# Patient Record
Sex: FEMALE | Race: WHITE | NOT HISPANIC OR LATINO | Employment: OTHER | URBAN - NONMETROPOLITAN AREA
[De-identification: names, ages, dates, MRNs, and addresses within clinical notes are randomized per-mention and may not be internally consistent; named-entity substitution may affect disease eponyms.]

---

## 2017-07-27 ENCOUNTER — TELEPHONE (OUTPATIENT)
Dept: OBSTETRICS AND GYNECOLOGY | Facility: CLINIC | Age: 77
End: 2017-07-27

## 2017-07-27 NOTE — TELEPHONE ENCOUNTER
----- Message from Tonia Polanco sent at 7/27/2017  2:38 PM CDT -----  Regarding: REFILL/HORMONE  Contact: [phone]  ESTRADOL..0.5/HAS APPT 0918./W [facility].. INTO ..[facility] [place]

## 2017-09-20 ENCOUNTER — OFFICE VISIT (OUTPATIENT)
Dept: OBSTETRICS AND GYNECOLOGY | Facility: CLINIC | Age: 77
End: 2017-09-20

## 2017-09-20 VITALS
SYSTOLIC BLOOD PRESSURE: 131 MMHG | BODY MASS INDEX: 26.2 KG/M2 | WEIGHT: 163 LBS | DIASTOLIC BLOOD PRESSURE: 63 MMHG | HEIGHT: 66 IN

## 2017-09-20 DIAGNOSIS — Z12.31 ENCOUNTER FOR SCREENING MAMMOGRAM FOR BREAST CANCER: ICD-10-CM

## 2017-09-20 DIAGNOSIS — B37.2 CANDIDAL SKIN INFECTION: Chronic | ICD-10-CM

## 2017-09-20 DIAGNOSIS — N32.81 OVERACTIVE BLADDER: Chronic | ICD-10-CM

## 2017-09-20 DIAGNOSIS — N95.1 MENOPAUSAL AND FEMALE CLIMACTERIC STATES: Primary | Chronic | ICD-10-CM

## 2017-09-20 PROCEDURE — 99204 OFFICE O/P NEW MOD 45 MIN: CPT | Performed by: OBSTETRICS & GYNECOLOGY

## 2017-09-20 RX ORDER — CLOTRIMAZOLE AND BETAMETHASONE DIPROPIONATE 10; .64 MG/G; MG/G
CREAM TOPICAL 2 TIMES DAILY
Qty: 1 EACH | Refills: 12 | Status: SHIPPED | OUTPATIENT
Start: 2017-09-20

## 2017-09-20 RX ORDER — PIOGLITAZONEHYDROCHLORIDE 15 MG/1
15 TABLET ORAL EVERY MORNING
COMMUNITY
End: 2020-03-17 | Stop reason: ALTCHOICE

## 2017-09-20 RX ORDER — GABAPENTIN 300 MG/1
300 CAPSULE ORAL 3 TIMES DAILY
COMMUNITY

## 2017-09-20 RX ORDER — SIMVASTATIN 40 MG
40 TABLET ORAL NIGHTLY
COMMUNITY

## 2017-09-20 RX ORDER — MELOXICAM 15 MG/1
15 TABLET ORAL DAILY
COMMUNITY
End: 2020-03-17 | Stop reason: ALTCHOICE

## 2017-09-20 RX ORDER — FLUTICASONE PROPIONATE 50 MCG
2 SPRAY, SUSPENSION (ML) NASAL DAILY
COMMUNITY

## 2017-09-20 RX ORDER — HYDROCORTISONE ACETATE 0.5 %
1 CREAM (GRAM) TOPICAL DAILY
COMMUNITY
End: 2020-03-17 | Stop reason: ALTCHOICE

## 2017-09-20 RX ORDER — FOLIC ACID 1 MG/1
1 TABLET ORAL DAILY
COMMUNITY

## 2017-09-20 RX ORDER — RANITIDINE 150 MG/1
150 TABLET ORAL 2 TIMES DAILY
COMMUNITY
End: 2020-03-17 | Stop reason: ALTCHOICE

## 2017-09-20 RX ORDER — ESTRADIOL 1 MG/1
1 TABLET ORAL DAILY
COMMUNITY
End: 2019-02-05 | Stop reason: SDUPTHER

## 2017-09-20 RX ORDER — ASPIRIN 81 MG/1
81 TABLET ORAL DAILY
COMMUNITY

## 2017-09-20 RX ORDER — BIOTIN 10 MG
1 TABLET ORAL DAILY
COMMUNITY

## 2017-09-20 RX ORDER — LOSARTAN POTASSIUM 50 MG/1
50 TABLET ORAL NIGHTLY
COMMUNITY
End: 2020-05-21 | Stop reason: ALTCHOICE

## 2017-09-20 RX ORDER — CETIRIZINE HYDROCHLORIDE 10 MG/1
10 TABLET ORAL DAILY
COMMUNITY

## 2017-09-20 RX ORDER — CARVEDILOL 6.25 MG/1
6.25 TABLET ORAL 2 TIMES DAILY WITH MEALS
COMMUNITY
End: 2020-03-17 | Stop reason: ALTCHOICE

## 2017-09-20 RX ORDER — FERROUS SULFATE 325(65) MG
325 TABLET ORAL
COMMUNITY

## 2017-09-20 NOTE — PROGRESS NOTES
Karen Steven is a 77 y.o. y/o female     Chief Complaint: Establish care, overactive bladder, yeast infection of the skin, menopausal    HPI: 77-year-old  with 4 prior vaginal deliveries.  She has had a hysterectomy with BSO.  She is here to establish care, treat yeast infection of the skin, and discuss options for treatment of overactive bladder.  She also needs her mammogram.    She initially try generic oxybutynin for the overactive bladder, and it helps some, but it dried her out so much that she quit taking it.  She saw Dr. Ngael in urology.  Dr. Franke initially placed her on Vesicare, but it cost her more than $300 a month, and she could not afford that.  She was then placed on the Oxytrol patch, and that is worked pretty well for her with much less side effects.    She needs her mammogram.  She gets Candida infections of the skin, particularly in the folds of her skin.  A topical antifungal with steroid usually helps with that.    I've ordered her mammogram.  We discussed the Oxytrol patch, and I recommended that she replace the patch every  and Thursday.  We discussed Kegel exercises.    Review of Systems   Constitutional: Positive for fatigue. Negative for activity change, appetite change, chills, diaphoresis, fever and unexpected weight change.   Gastrointestinal: Negative for abdominal pain, constipation, diarrhea and nausea.   Genitourinary: Negative for difficulty urinating, dyspareunia, dysuria, pelvic pain, urgency, vaginal bleeding, vaginal discharge and vaginal pain.   Neurological: Negative for headaches.   Psychiatric/Behavioral: Negative for dysphoric mood. The patient is not nervous/anxious.    All other systems reviewed and are negative.     Breast ROS: negative    The following portions of the patient's history were reviewed and updated as appropriate: allergies, current medications, past family history, past medical history, past social history, past surgical history and  problem list.    Allergies   Allergen Reactions   • Altace [Ramipril] Swelling          Current Outpatient Prescriptions:   •  aspirin 81 MG EC tablet, Take 81 mg by mouth Daily., Disp: , Rfl:   •  Biotin (BIOTIN MAXIMUM STRENGTH) 10 MG tablet, Take 1 tablet by mouth Daily., Disp: , Rfl:   •  carvedilol (COREG) 6.25 MG tablet, Take 6.25 mg by mouth 2 (Two) Times a Day With Meals., Disp: , Rfl:   •  cetirizine (zyrTEC) 10 MG tablet, Take 10 mg by mouth Daily., Disp: , Rfl:   •  Cholecalciferol (VITAMIN D3) 5000 units tablet tablet, Take 5,000 Units by mouth Daily., Disp: , Rfl:   •  estradiol (ESTRACE) 1 MG tablet, Take 1 mg by mouth Daily., Disp: , Rfl:   •  ferrous sulfate 325 (65 FE) MG tablet, Take 325 mg by mouth Daily With Breakfast., Disp: , Rfl:   •  fluticasone (FLONASE) 50 MCG/ACT nasal spray, 2 sprays into each nostril Daily., Disp: , Rfl:   •  folic acid (FOLVITE) 1 MG tablet, Take 1 mg by mouth Daily., Disp: , Rfl:   •  gabapentin (NEURONTIN) 300 MG capsule, Take 300 mg by mouth 2 (Two) Times a Day., Disp: , Rfl:   •  Glucosamine-Chondroit-Vit C-Mn (GLUCOSAMINE CHONDR 1500 COMPLX) capsule, Take 1 tablet by mouth Daily., Disp: , Rfl:   •  losartan (COZAAR) 50 MG tablet, Take 50 mg by mouth Every Night., Disp: , Rfl:   •  meloxicam (MOBIC) 15 MG tablet, Take 15 mg by mouth Daily., Disp: , Rfl:   •  Multiple Vitamins-Minerals (CENTRUM ULTRA WOMENS PO), Take 1 tablet by mouth Daily., Disp: , Rfl:   •  nystatin-triamcinolone (MYCOLOG II) 911534-6.1 UNIT/GM-% cream, Apply 1 application topically 4 (Four) Times a Day., Disp: , Rfl:   •  [START ON 9/21/2017] oxybutynin (OXYTROL) 3.9 MG/24HR, Place 1 patch on the skin 2 (Two) Times a Week., Disp: 24 patch, Rfl: 4  •  pioglitazone (ACTOS) 15 MG tablet, Take 15 mg by mouth Every Morning., Disp: , Rfl:   •  raNITIdine (ZANTAC) 150 MG tablet, Take 150 mg by mouth 2 (Two) Times a Day., Disp: , Rfl:   •  sertraline (ZOLOFT) 50 MG tablet, Take 50 mg by mouth every night  at bedtime., Disp: , Rfl:   •  simvastatin (ZOCOR) 40 MG tablet, Take 40 mg by mouth Every Night., Disp: , Rfl:   •  clotrimazole-betamethasone (LOTRISONE) 1-0.05 % cream, Apply  topically 2 (Two) Times a Day. 45 g tube, Disp: 1 each, Rfl: 12     The patient has a family history of   History reviewed. No pertinent family history.     Past Medical History:   Diagnosis Date   • Candidal skin infection 9/20/2017   • Menopausal and female climacteric states 9/20/2017   • Overactive bladder 9/20/2017        OB History     No data available           Social History     Social History   • Marital status:      Spouse name: N/A   • Number of children: N/A   • Years of education: N/A     Occupational History   • Not on file.     Social History Main Topics   • Smoking status: Never Smoker   • Smokeless tobacco: Not on file   • Alcohol use Not on file      Comment: Occasionally   • Drug use: No   • Sexual activity: Yes     Birth control/ protection: None      Comment: Hysterectomy     Other Topics Concern   • Not on file     Social History Narrative   • No narrative on file        Past Surgical History:   Procedure Laterality Date   • BACK SURGERY  03/24/2009    L-2, L-3/2 Rods 4 Screws   • BACK SURGERY  11/17/2009    L-3, L-4, 2 Rods 6 Screws   • BACK SURGERY  04/07/2010    L-5, S-1 2 Rods 8 Screws   • BACK SURGERY  04/21/2011   • BACK SURGERY  06/26/2014   • BLADDER SURGERY  03/24/1997   • BREAST BIOPSY  1986   • FOOT SURGERY Left 2001    Bunion   • FOOT SURGERY Right 2004    Bunion   • FOOT SURGERY Right 2005    Removed 3 Metatarsus heads   • HIP SURGERY Left 08/27/2015    Torn Tendon   • HYSTERECTOMY      Partial in 1970   • HYSTERECTOMY      Complete   • KNEE SURGERY Right 06/26/2012   • KNEE SURGERY Left 08/21/2014    Replacement        Patient Active Problem List   Diagnosis   • Menopausal and female climacteric states   • Overactive bladder   • Candidal skin infection        Documented Vitals    09/20/17 1348  "  BP: 131/63   Weight: 163 lb (73.9 kg)   Height: 66\" (167.6 cm)   PainSc: 0-No pain       Physical Exam   Constitutional: She is oriented to person, place, and time. No distress.   Lightly overweight white female weighing 163 pounds with BMI 26.3.   HENT:   Head: Normocephalic and atraumatic.   Eyes: Conjunctivae and EOM are normal. Pupils are equal, round, and reactive to light.   Neck: Normal range of motion. Neck supple. No JVD present. No tracheal deviation present. No thyromegaly present.   Cardiovascular: Normal rate, regular rhythm, normal heart sounds and intact distal pulses.  Exam reveals no gallop and no friction rub.    No murmur heard.  Pulmonary/Chest: Effort normal and breath sounds normal. No stridor. No respiratory distress. She has no wheezes. She has no rales. She exhibits no tenderness.   Abdominal: Soft. Bowel sounds are normal. She exhibits no distension and no mass. There is no tenderness. There is no rebound and no guarding. No hernia.   Musculoskeletal: Normal range of motion. She exhibits no edema, tenderness or deformity.   Lymphadenopathy:     She has no cervical adenopathy.   Neurological: She is alert and oriented to person, place, and time. She has normal reflexes. She displays normal reflexes. No cranial nerve deficit. She exhibits normal muscle tone. Coordination normal.   Skin: Skin is warm and dry. No rash noted. She is not diaphoretic. No erythema. No pallor.   Psychiatric: She has a normal mood and affect. Her behavior is normal. Judgment and thought content normal.   Nursing note and vitals reviewed.       Assessment        Diagnosis Plan   1. Menopausal and female climacteric states     2. Overactive bladder     3. Candidal skin infection     4. Encounter for screening mammogram for breast cancer  Mammo Screening Digital Tomosynthesis Bilateral With CAD         Plan      1. Oxytrol patch replaced every Sunday and Thursday.  2. Samples of Vesicare and Myrbetriq.  3. Lotrisone " cream to affected area twice a day.  4. Schedule mammogram.  5. Encouraged in diet and exercise.  6. Handouts on depression, hot flashes, exercise, and vitamin use.   7. Follow-up in 1 year.  Follow-up sooner as needed.            This document has been electronically signed by Albaro Lowery MD on September 20, 2017 2:45 PM

## 2019-02-05 ENCOUNTER — TELEPHONE (OUTPATIENT)
Dept: OBSTETRICS AND GYNECOLOGY | Facility: CLINIC | Age: 79
End: 2019-02-05

## 2019-02-05 RX ORDER — ESTRADIOL 1 MG/1
1 TABLET ORAL DAILY
Qty: 30 TABLET | Refills: 2 | Status: SHIPPED | OUTPATIENT
Start: 2019-02-05 | End: 2020-03-17 | Stop reason: ALTCHOICE

## 2020-03-17 ENCOUNTER — OFFICE VISIT (OUTPATIENT)
Dept: CARDIAC SURGERY | Facility: CLINIC | Age: 80
End: 2020-03-17

## 2020-03-17 VITALS
DIASTOLIC BLOOD PRESSURE: 64 MMHG | HEIGHT: 66 IN | WEIGHT: 163 LBS | HEART RATE: 76 BPM | TEMPERATURE: 97.6 F | SYSTOLIC BLOOD PRESSURE: 120 MMHG | BODY MASS INDEX: 26.2 KG/M2

## 2020-03-17 DIAGNOSIS — R09.89 BRUIT OF RIGHT CAROTID ARTERY: ICD-10-CM

## 2020-03-17 DIAGNOSIS — I73.9 PVD (PERIPHERAL VASCULAR DISEASE) WITH CLAUDICATION (HCC): Primary | ICD-10-CM

## 2020-03-17 PROCEDURE — 99203 OFFICE O/P NEW LOW 30 MIN: CPT | Performed by: NURSE PRACTITIONER

## 2020-03-17 NOTE — PROGRESS NOTES
Subjective   Patient ID: Karen Steven is a 79 y.o. female is being seen for consultation today at the request of Hansa Padilla*  Chief Complaint   Patient presents with   • new patient   • Pain   Claudication with burning leg pain.     History of Present Illness  78 y/o lady presents for vascular evaluation for claudication, and varicose veins.   Pain VAS 5 near continuous with increase with walking.  Denies any open sores, mild color changes.  Sensation intact.  History significant for multiple back and knee surgery.  Left hip problems with alter gait.  Uses cane.  Has neuropathy, anemia, HLP, HTN, and DM.  Risk factors:   DM, HTN, HLP     The following portions of the patient's history were reviewed and updated as appropriate: allergies, current medications, past family history, past medical history, past social history, past surgical history and problem list.  Past Medical History:   Diagnosis Date   • Allergies    • Candidal skin infection 9/20/2017   • Diabetes (CMS/[facility])    • Hyperlipidemia    • Hypertension    • Menopausal and female climacteric states 9/20/2017   • Overactive bladder 9/20/2017     Past Surgical History:   Procedure Laterality Date   • BACK SURGERY  03/24/2009    L-2, L-3/2 Rods 4 Screws   • BACK SURGERY  11/17/2009    L-3, L-4, 2 Rods 6 Screws   • BACK SURGERY  04/07/2010    L-5, S-1 2 Rods 8 Screws   • BACK SURGERY  04/21/2011   • BACK SURGERY  06/26/2014   • BLADDER SURGERY  03/24/1997   • BREAST BIOPSY  1986   • FOOT SURGERY Left 2001    Bunion   • FOOT SURGERY Right 2004    Bunion   • FOOT SURGERY Right 2005    Removed 3 Metatarsus heads   • HIP SURGERY Left 08/27/2015    Torn Tendon   • HYSTERECTOMY      Partial in 1970   • HYSTERECTOMY      Complete   • KNEE SURGERY Right 06/26/2012   • KNEE SURGERY Left 08/21/2014    Replacement         Allergies   Allergen Reactions   • Altace [Ramipril] Swelling       Current Outpatient Medications:   •  aspirin 81 MG EC tablet, Take 81 mg  by mouth Daily., Disp: , Rfl:   •  Biotin (BIOTIN MAXIMUM STRENGTH) 10 MG tablet, Take 1 tablet by mouth Daily., Disp: , Rfl:   •  cetirizine (zyrTEC) 10 MG tablet, Take 10 mg by mouth Daily., Disp: , Rfl:   •  Cholecalciferol (VITAMIN D3) 5000 units tablet tablet, Take 5,000 Units by mouth Daily., Disp: , Rfl:   •  clotrimazole-betamethasone (LOTRISONE) 1-0.05 % cream, Apply  topically 2 (Two) Times a Day. 45 g tube, Disp: 1 each, Rfl: 12  •  ferrous sulfate 325 (65 FE) MG tablet, Take 325 mg by mouth Daily With Breakfast., Disp: , Rfl:   •  fluticasone (FLONASE) 50 MCG/ACT nasal spray, 2 sprays into each nostril Daily., Disp: , Rfl:   •  folic acid (FOLVITE) 1 MG tablet, Take 1 mg by mouth Daily., Disp: , Rfl:   •  gabapentin (NEURONTIN) 300 MG capsule, Take 300 mg by mouth 2 (Two) Times a Day., Disp: , Rfl:   •  losartan (COZAAR) 50 MG tablet, Take 50 mg by mouth Every Night., Disp: , Rfl:   •  Multiple Vitamins-Minerals (CENTRUM ULTRA WOMENS PO), Take 1 tablet by mouth Daily., Disp: , Rfl:   •  simvastatin (ZOCOR) 40 MG tablet, Take 40 mg by mouth Every Night., Disp: , Rfl:     Review of Systems   Constitution: Negative for decreased appetite, fever, weight gain and weight loss.   HENT: Negative for hoarse voice, sore throat and stridor.    Eyes: Negative for visual disturbance.        No amaurosis fugax, TIA, or CVA.       Cardiovascular: Positive for claudication. Negative for chest pain, irregular heartbeat, leg swelling and syncope.        LE:  N Open sores  N color changes (sore of feet mottled)  N coldness.           N numbness or paresthesias  Burning pain  Varicosities      Respiratory: Negative for cough, shortness of breath and wheezing.    Hematologic/Lymphatic: Negative for bleeding problem. Bruises/bleeds easily.   Skin: Positive for color change and dry skin. Negative for rash.   Musculoskeletal: Positive for arthritis, back pain and muscle cramps. Negative for falls.   Gastrointestinal: Negative  for hematemesis and melena.   Genitourinary: Negative for hematuria.   Neurological: Positive for paresthesias. Negative for brief paralysis, focal weakness, numbness, sensory change and weakness.   Psychiatric/Behavioral: Negative for altered mental status.   Allergic/Immunologic: Negative for hives.        Objective   Physical Exam   Constitutional: She is oriented to person, place, and time. She appears well-nourished. No distress.   Body mass index is 26.31 kg/m².     HENT:   Head: Normocephalic.   Mouth/Throat: Oropharynx is clear and moist.   Tongue midline Facial movements symmetrical  Sensation intact      Eyes: Pupils are equal, round, and reactive to light. Conjunctivae and EOM are normal.   Neck: Neck supple. No JVD present.   Cardiovascular: Normal rate, regular rhythm, normal heart sounds and intact distal pulses.   Pulses:       Carotid pulses are 1+ on the right side with bruit, and 1+ on the left side.       Radial pulses are 2+ on the right side, and 2+ on the left side.        Dorsalis pedis pulses are 1+ on the right side, and 1+ on the left side.        Posterior tibial pulses are 1+ on the right side, and 1+ on the left side.   Doppler exam Tri/Biphasic sounds  Multiple varicosities   R sole of foot mild cyanosis  L sole of foot with mottling  Cool warm   Doppler exam biphasic to triphasic signals   Cap refill < 2 sec   Sensation and mobility intact    Pulmonary/Chest: Effort normal and breath sounds normal. No respiratory distress. She has no rales.   Abdominal: Soft. Bowel sounds are normal. There is no tenderness.   Musculoskeletal: She exhibits no edema, tenderness or deformity.   Symmetrical = movements      Neurological: She is alert and oriented to person, place, and time. No cranial nerve deficit or sensory deficit.   Skin: Skin is warm. Capillary refill takes less than 2 seconds. No rash noted. No erythema. No pallor.   Psychiatric: Her behavior is normal. Judgment normal.   Nursing  note and vitals reviewed.  Body mass index is 26.31 kg/m².      Vitals:    03/17/20 1130   BP: 120/64   Pulse: 76   Temp: 97.6 °F (36.4 °C)         Assessment/Plan   Independent Review of Radiographic Studies:    None with today's visit     1. PVD (peripheral vascular disease) with claudication (CMS/HCC)  CHARLEEN:  Vascular test for blood flow in both legs: CHARLEEN  If signs and symptoms of ischemia should occur including but not limited to pale/blue discoloration of limb, increasing pain with ambulation or at rest, or a non-healing wound. Patient is to notify [facility] V[facility] center for immediate evaluation.  Burst activity.  Continue antiplatelet, statin, ARB, and Vit D     - Doppler Ankle Brachial Index Single Level CAR; Future    2. Bruit of right carotid artery  Carotid Duplex   - Duplex Carotid Ultrasound CAR; Future    Follow up with CVTS NP at [place] after testing    Detailed discussion regarding risks, benefits, and treatment plan. Images independently reviewed. Patient understands, agrees, and wishes to proceed with plan.

## 2020-03-17 NOTE — PATIENT INSTRUCTIONS
CHARLEEN:  Vascular test for blood flow in both legs  If signs and symptoms of ischemia should occur including but not limited to pale/blue discoloration of limb, increasing pain with ambulation or at rest, or a non-healing wound. Patient is to notify [facility] V[facility] center for immediate evaluation.  Carotid Duplex for bruit=turbulent blood flow in neck artery  Follow up with Melvin Kim or Tess Gross Nurse Pracitioners in [place]   Burst activity Avoid prolonged walking

## 2020-05-21 ENCOUNTER — OFFICE VISIT (OUTPATIENT)
Dept: CARDIAC SURGERY | Facility: CLINIC | Age: 80
End: 2020-05-21

## 2020-05-21 VITALS
SYSTOLIC BLOOD PRESSURE: 140 MMHG | DIASTOLIC BLOOD PRESSURE: 69 MMHG | TEMPERATURE: 97.7 F | HEART RATE: 58 BPM | OXYGEN SATURATION: 99 %

## 2020-05-21 DIAGNOSIS — M54.16 LUMBAR RADICULOPATHY: ICD-10-CM

## 2020-05-21 DIAGNOSIS — I65.23 CAROTID STENOSIS, ASYMPTOMATIC, BILATERAL: ICD-10-CM

## 2020-05-21 DIAGNOSIS — I10 BENIGN HYPERTENSION: ICD-10-CM

## 2020-05-21 DIAGNOSIS — R29.898 LEG FATIGUE: Primary | ICD-10-CM

## 2020-05-21 DIAGNOSIS — E78.5 DYSLIPIDEMIA: ICD-10-CM

## 2020-05-21 PROBLEM — I73.9 PVD (PERIPHERAL VASCULAR DISEASE) WITH CLAUDICATION: Status: RESOLVED | Noted: 2020-03-17 | Resolved: 2020-05-21

## 2020-05-21 PROCEDURE — 99214 OFFICE O/P EST MOD 30 MIN: CPT | Performed by: NURSE PRACTITIONER

## 2020-05-21 NOTE — PATIENT INSTRUCTIONS
Normal arterial flow bilateral lower extremities  May return for any new symptoms (nonhealing sores)    Symptoms of leg fatigue/lumbar pain  -trial compression stockings (8-10mmHg OR 10-20mmHg)    Mild Carotid Artery Stenosis-LEFT  Medical Management: ASA,STATIN   If you should experience any neurological symptoms including but not limited to visual or speech disturbances confusion, seizures, or weakness of limbs of one side of your body notify Heart and Vascular center immediately for evaluation or if after hours present to the nearest Emergency Department.    May recheck every 2 years

## 2020-05-28 NOTE — PROGRESS NOTES
Subjective   Patient ID: Karen Steven is a 80 y.o. female is being seen for follow up.    Chief Complaint   Patient presents with   • Peripheral Vascular Disease   • Carotid Artery Disease     The following portions of the patient's history were reviewed and updated as appropriate: allergies, current medications, past family history, past medical history, past social history, past surgical history and problem list.    PCP: David    80 year old female with HTN(stable, increased risk stroke, rupture), Hyperlipidemia(stable, increased risk cardiovascular events) and Diabetes Mellitus(stable, increased risk cardiovascular events) Neuropathy. Multiple back surgeries. Hip pain.  never smoked.  Complains of aching/cramping while standing. Does not worsen with ambulation.  No TIA stroke amaurosis.  No MI claudication. No other associated signs, symptoms or modifying factors.    5/21/2020:  CHARLEEN: RIGHT 1.1 Triphasic  LEFT 1.1 Triphasic    5/21/2020:  Carotid Duplex: RIGHT 0-49% (92/1.7) LEFT 0-49% (106/1.3) Antegrade     Past Medical History:   Diagnosis Date   • Allergies    • Candidal skin infection 9/20/2017   • Diabetes (CMS/[facility])    • Hyperlipidemia    • Hypertension    • Menopausal and female climacteric states 9/20/2017   • Overactive bladder 9/20/2017     Past Surgical History:   Procedure Laterality Date   • BACK SURGERY  03/24/2009    L-2, L-3/2 Rods 4 Screws   • BACK SURGERY  11/17/2009    L-3, L-4, 2 Rods 6 Screws   • BACK SURGERY  04/07/2010    L-5, S-1 2 Rods 8 Screws   • BACK SURGERY  04/21/2011   • BACK SURGERY  06/26/2014   • BLADDER SURGERY  03/24/1997   • BREAST BIOPSY  1986   • FOOT SURGERY Left 2001    Bunion   • FOOT SURGERY Right 2004    Bunion   • FOOT SURGERY Right 2005    Removed 3 Metatarsus heads   • HIP SURGERY Left 08/27/2015    Torn Tendon   • HYSTERECTOMY      Partial in 1970   • HYSTERECTOMY      Complete   • KNEE SURGERY Right 06/26/2012   • KNEE SURGERY Left 08/21/2014    Replacement          Allergies   Allergen Reactions   • Altace [Ramipril] Swelling       Current Outpatient Medications:   •  aspirin 81 MG EC tablet, Take 81 mg by mouth Daily., Disp: , Rfl:   •  Biotin (BIOTIN MAXIMUM STRENGTH) 10 MG tablet, Take 1 tablet by mouth Daily., Disp: , Rfl:   •  cetirizine (zyrTEC) 10 MG tablet, Take 10 mg by mouth Daily., Disp: , Rfl:   •  Cholecalciferol (VITAMIN D3) 5000 units tablet tablet, Take 5,000 Units by mouth Daily., Disp: , Rfl:   •  clotrimazole-betamethasone (LOTRISONE) 1-0.05 % cream, Apply  topically 2 (Two) Times a Day. 45 g tube, Disp: 1 each, Rfl: 12  •  ferrous sulfate 325 (65 FE) MG tablet, Take 325 mg by mouth Daily With Breakfast., Disp: , Rfl:   •  fluticasone (FLONASE) 50 MCG/ACT nasal spray, 2 sprays into each nostril Daily., Disp: , Rfl:   •  folic acid (FOLVITE) 1 MG tablet, Take 1 mg by mouth Daily., Disp: , Rfl:   •  gabapentin (NEURONTIN) 300 MG capsule, Take 300 mg by mouth 2 (Two) Times a Day., Disp: , Rfl:   •  Multiple Vitamins-Minerals (CENTRUM ULTRA WOMENS PO), Take 1 tablet by mouth Daily., Disp: , Rfl:   •  simvastatin (ZOCOR) 40 MG tablet, Take 40 mg by mouth Every Night., Disp: , Rfl:     Review of Systems   Constitution: Negative for decreased appetite, fever, weight gain and weight loss.   HENT: Negative for hoarse voice, sore throat and stridor.    Eyes: Negative for visual disturbance.        No amaurosis fugax, TIA, or CVA.       Cardiovascular: Positive for claudication. Negative for chest pain, irregular heartbeat, leg swelling and syncope.        LE:  N Open sores  N color changes (sore of feet mottled)  N coldness.           N numbness or paresthesias  Burning pain  Varicosities      Respiratory: Negative for cough, shortness of breath and wheezing.    Hematologic/Lymphatic: Negative for bleeding problem. Bruises/bleeds easily.   Skin: Positive for color change and dry skin. Negative for rash.   Musculoskeletal: Positive for arthritis, back pain and  muscle cramps. Negative for falls.   Gastrointestinal: Negative for hematemesis and melena.   Genitourinary: Negative for hematuria.   Neurological: Positive for paresthesias. Negative for brief paralysis, focal weakness, numbness, sensory change and weakness.   Psychiatric/Behavioral: Negative for altered mental status.   Allergic/Immunologic: Negative for hives.        Objective    Vitals:    05/21/20 1508   BP: 140/69   Pulse: 58   Temp: 97.7 °F (36.5 °C)   SpO2: 99%    There is no height or weight on file to calculate BMI.    Physical Exam   Constitutional: She is oriented to person, place, and time. She appears well-nourished. No distress.   Body mass index is 26.31 kg/m².     HENT:   Head: Normocephalic.   Mouth/Throat: Oropharynx is clear and moist.   Tongue midline Facial movements symmetrical  Sensation intact      Eyes: Pupils are equal, round, and reactive to light. Conjunctivae and EOM are normal.   Neck: Neck supple. No JVD present.   Cardiovascular: Normal rate, regular rhythm, normal heart sounds and intact distal pulses.   Pulses:       Carotid pulses are 1+ on the right side with bruit, and 1+ on the left side.       Radial pulses are 2+ on the right side, and 2+ on the left side.        Dorsalis pedis pulses are 2+ on the right side, and 2+ on the left side.        Posterior tibial pulses are 2+ on the right side, and 2+ on the left side.   Multiple varicosities   Sensation and mobility intact    Pulmonary/Chest: Effort normal and breath sounds normal. No respiratory distress. She has no rales.   Abdominal: Soft. Bowel sounds are normal. There is no tenderness.   Musculoskeletal: She exhibits no edema, tenderness or deformity.   Symmetrical = movements      Neurological: She is alert and oriented to person, place, and time. No cranial nerve deficit or sensory deficit.   Skin: Skin is warm. Capillary refill takes less than 2 seconds. No rash noted. No erythema. No pallor.   Psychiatric: Her behavior  is normal. Judgment normal.   Nursing note and vitals reviewed.        Assessment/Plan   Independent Review of Radiographic Studies:    Detailed discussion regarding risks, benefits, and treatment plan. Images independently reviewed. Patient understands, agrees, and wishes to proceed with plan.      1. Leg fatigue  Symptoms of leg fatigue/lumbar pain  -trial compression stockings (8-10mmHg OR 10-20mmHg)    2. Benign hypertension  controlled    3. Lumbar radiculopathy  Normal arterial flow bilateral lower extremities  May return for any new symptoms (nonhealing sores)    4. Dyslipidemia  Lipid-lowering therapy has been proven beneficial in patients with cardio-vascular disease. Current guidelines recommend statin treatment for all patients with PAD,CAD and carotid stenosis. Statins are beneficial in preventing cardiovascular events, increasing functional capacity and lower the risk of adverse limb loss in PAD. Statins decrease the progression of plaque formation and may improve peripheral vessel lining, and aid in reversing atherosclerosis.     5. Carotid stenosis, asymptomatic, bilateral  Mild Carotid Artery Stenosis-LEFT  Medical Management: ASA,STATIN   If you should experience any neurological symptoms including but not limited to visual or speech disturbances confusion, seizures, or weakness of limbs of one side of your body notify [facility] and V[facility] center immediately for evaluation or if after hours present to the nearest Emergency Department.    May recheck every 2 years           This document has been electronically signed by NIKHIL Harris on May 28, 2020 10:47

## 2021-10-14 ENCOUNTER — HOSPITAL ENCOUNTER (EMERGENCY)
Facility: HOSPITAL | Age: 81
Discharge: HOME | End: 2021-10-14
Attending: EMERGENCY MEDICINE | Admitting: EMERGENCY MEDICINE

## 2021-10-14 VITALS
OXYGEN SATURATION: 97 % | HEIGHT: 66 IN | RESPIRATION RATE: 17 BRPM | HEART RATE: 72 BPM | TEMPERATURE: 98.3 F | SYSTOLIC BLOOD PRESSURE: 152 MMHG | DIASTOLIC BLOOD PRESSURE: 63 MMHG | BODY MASS INDEX: 24.11 KG/M2 | WEIGHT: 150 LBS

## 2021-10-14 DIAGNOSIS — R07.89 MUSCULOSKELETAL CHEST PAIN: Primary | ICD-10-CM

## 2021-10-14 LAB
ALBUMIN SERPL-MCNC: 4.2 G/DL (ref 3.5–5.2)
ALBUMIN/GLOB SERPL: 1.4 G/DL
ALP SERPL-CCNC: 80 U/L (ref 39–117)
ALT SERPL W P-5'-P-CCNC: 19 U/L (ref 1–33)
ANION GAP SERPL CALCULATED.3IONS-SCNC: 9 MMOL/L (ref 5–15)
AST SERPL-CCNC: 22 U/L (ref 1–32)
BASOPHILS # BLD AUTO: 0.03 10*3/MM3 (ref 0–0.2)
BASOPHILS NFR BLD AUTO: 0.3 % (ref 0–1.5)
BILIRUB SERPL-MCNC: 0.5 MG/DL (ref 0–1.2)
BUN SERPL-MCNC: 20 MG/DL (ref 8–23)
BUN/CREAT SERPL: 18.7 (ref 7–25)
CALCIUM SPEC-SCNC: 9.7 MG/DL (ref 8.6–10.5)
CHLORIDE SERPL-SCNC: 95 MMOL/L (ref 98–107)
CO2 SERPL-SCNC: 29 MMOL/L (ref 22–29)
CREAT SERPL-MCNC: 1.07 MG/DL (ref 0.57–1)
DEPRECATED RDW RBC AUTO: 46.8 FL (ref 37–54)
EOSINOPHIL # BLD AUTO: 0.04 10*3/MM3 (ref 0–0.4)
EOSINOPHIL NFR BLD AUTO: 0.4 % (ref 0.3–6.2)
ERYTHROCYTE [DISTWIDTH] IN BLOOD BY AUTOMATED COUNT: 13.8 % (ref 12.3–15.4)
GFR SERPL CREATININE-BSD FRML MDRD: 49 ML/MIN/1.73
GLOBULIN UR ELPH-MCNC: 3.1 GM/DL
GLUCOSE SERPL-MCNC: 98 MG/DL (ref 65–99)
HCT VFR BLD AUTO: 35.1 % (ref 34–46.6)
HGB BLD-MCNC: 11.5 G/DL (ref 12–15.9)
HOLD SPECIMEN: NORMAL
IMM GRANULOCYTES # BLD AUTO: 0.06 10*3/MM3 (ref 0–0.05)
IMM GRANULOCYTES NFR BLD AUTO: 0.5 % (ref 0–0.5)
LYMPHOCYTES # BLD AUTO: 1.65 10*3/MM3 (ref 0.7–3.1)
LYMPHOCYTES NFR BLD AUTO: 14.7 % (ref 19.6–45.3)
MAGNESIUM SERPL-MCNC: 1.8 MG/DL (ref 1.6–2.4)
MCH RBC QN AUTO: 30.7 PG (ref 26.6–33)
MCHC RBC AUTO-ENTMCNC: 32.8 G/DL (ref 31.5–35.7)
MCV RBC AUTO: 93.6 FL (ref 79–97)
MONOCYTES # BLD AUTO: 1.19 10*3/MM3 (ref 0.1–0.9)
MONOCYTES NFR BLD AUTO: 10.6 % (ref 5–12)
NEUTROPHILS NFR BLD AUTO: 73.5 % (ref 42.7–76)
NEUTROPHILS NFR BLD AUTO: 8.29 10*3/MM3 (ref 1.7–7)
NRBC BLD AUTO-RTO: 0 /100 WBC (ref 0–0.2)
NT-PROBNP SERPL-MCNC: 922.2 PG/ML (ref 0–1800)
PLATELET # BLD AUTO: 353 10*3/MM3 (ref 140–450)
PMV BLD AUTO: 10 FL (ref 6–12)
POTASSIUM SERPL-SCNC: 3.9 MMOL/L (ref 3.5–5.2)
PROT SERPL-MCNC: 7.3 G/DL (ref 6–8.5)
RBC # BLD AUTO: 3.75 10*6/MM3 (ref 3.77–5.28)
SODIUM SERPL-SCNC: 133 MMOL/L (ref 136–145)
TROPONIN T SERPL-MCNC: <0.01 NG/ML (ref 0–0.03)
TSH SERPL DL<=0.05 MIU/L-ACNC: 3.64 UIU/ML (ref 0.27–4.2)
WBC # BLD AUTO: 11.26 10*3/MM3 (ref 3.4–10.8)

## 2021-10-14 PROCEDURE — 83735 ASSAY OF MAGNESIUM: CPT | Performed by: EMERGENCY MEDICINE

## 2021-10-14 PROCEDURE — 93010 ELECTROCARDIOGRAM REPORT: CPT | Performed by: INTERNAL MEDICINE

## 2021-10-14 PROCEDURE — 93005 ELECTROCARDIOGRAM TRACING: CPT | Performed by: EMERGENCY MEDICINE

## 2021-10-14 PROCEDURE — 84443 ASSAY THYROID STIM HORMONE: CPT | Performed by: EMERGENCY MEDICINE

## 2021-10-14 PROCEDURE — 83880 ASSAY OF NATRIURETIC PEPTIDE: CPT | Performed by: EMERGENCY MEDICINE

## 2021-10-14 PROCEDURE — 93005 ELECTROCARDIOGRAM TRACING: CPT

## 2021-10-14 PROCEDURE — 99283 EMERGENCY DEPT VISIT LOW MDM: CPT

## 2021-10-14 PROCEDURE — 85025 COMPLETE CBC W/AUTO DIFF WBC: CPT | Performed by: EMERGENCY MEDICINE

## 2021-10-14 PROCEDURE — 80053 COMPREHEN METABOLIC PANEL: CPT | Performed by: EMERGENCY MEDICINE

## 2021-10-14 PROCEDURE — 84484 ASSAY OF TROPONIN QUANT: CPT | Performed by: EMERGENCY MEDICINE

## 2021-10-14 RX ORDER — VENLAFAXINE HYDROCHLORIDE 37.5 MG/1
37.5 CAPSULE, EXTENDED RELEASE ORAL DAILY
COMMUNITY

## 2021-10-14 RX ORDER — SODIUM CHLORIDE 0.9 % (FLUSH) 0.9 %
10 SYRINGE (ML) INJECTION AS NEEDED
Status: DISCONTINUED | OUTPATIENT
Start: 2021-10-14 | End: 2021-10-14 | Stop reason: HOSPADM

## 2021-10-14 NOTE — ED NOTES
Patient does admit to having to put her dog of 15 years down approx. 1 week prior to 1st episode. Admits she has been grieving a good deal     Kiana Verdin, RN  10/14/21 1912

## 2021-10-14 NOTE — ED TRIAGE NOTES
Started having chest pain 2 weeks ago, seen at [facility] who told her to follow up with PCP, PCP felt it was muscle related and gave prescription for Toradol. Toradol has helped but pain came back yesterday. Hurts worse with deep breathing radiates to back. Only other symptoms have been Headache and slight nausea  Cardiologist, Dr nena Garcia ([facility]) was seen 9/17.

## 2021-10-14 NOTE — ED PROVIDER NOTES
Subjective   81-year-old female presents emergency department with complaint of chest pain.  This is been ongoing for couple of weeks.  She was recently seen at [facility] and by her primary care provider.  Felt musculoskeletal but when it continued/recurred today she presents for reevaluation.  She has history of hypertension and hyperlipidemia but no cardiac history.  Denies any significant family history.  Reports that it hurts worse when she takes a deep breath but denies exertional worsening of symptoms.  No shortness of breath associated with as well as no diaphoresis or nausea. Just prior to onset of symptoms 2 weeks ago she lost her dog of 15 years. Under a lot of stress.     Family history, surgical history, social history, current medications and allergies are reviewed with the patient and triage documentation and vitals are reviewed.      History provided by:  Patient   used: No        Review of Systems   Constitutional: Negative for chills and fever.   HENT: Negative for congestion and sore throat.    Eyes: Negative for photophobia and visual disturbance.   Respiratory: Negative for cough, shortness of breath and wheezing.    Cardiovascular: Positive for chest pain. Negative for palpitations and leg swelling.   Gastrointestinal: Negative for abdominal pain, diarrhea, nausea and vomiting.   Endocrine: Negative for polydipsia, polyphagia and polyuria.   Genitourinary: Negative for dysuria, frequency and urgency.   Musculoskeletal: Negative for arthralgias, back pain, myalgias and neck pain.   Skin: Negative for rash and wound.   Allergic/Immunologic: Negative.    Neurological: Negative.    Hematological: Negative.    Psychiatric/Behavioral: Negative.        Past Medical History:   Diagnosis Date   • Allergies    • Candidal skin infection 9/20/2017   • Diabetes (HCC)    • Hyperlipidemia    • Hypertension    • Menopausal and female climacteric states 9/20/2017   • Overactive bladder  9/20/2017       Allergies   Allergen Reactions   • Altace [Ramipril] Swelling       Past Surgical History:   Procedure Laterality Date   • BACK SURGERY  03/24/2009    L-2, L-3/2 Rods 4 Screws   • BACK SURGERY  11/17/2009    L-3, L-4, 2 Rods 6 Screws   • BACK SURGERY  04/07/2010    L-5, S-1 2 Rods 8 Screws   • BACK SURGERY  04/21/2011   • BACK SURGERY  06/26/2014   • BLADDER SURGERY  03/24/1997   • BREAST BIOPSY  1986   • FOOT SURGERY Left 2001    Bunion   • FOOT SURGERY Right 2004    Bunion   • FOOT SURGERY Right 2005    Removed 3 Metatarsus heads   • HIP SURGERY Left 08/27/2015    Torn Tendon   • HYSTERECTOMY      Partial in 1970   • HYSTERECTOMY      Complete   • KNEE SURGERY Right 06/26/2012   • KNEE SURGERY Left 08/21/2014    Replacement       History reviewed. No pertinent family history.    Social History     Socioeconomic History   • Marital status:    Tobacco Use   • Smoking status: Never Smoker   Vaping Use   • Vaping Use: Never used   Substance and Sexual Activity   • Alcohol use: Not Currently     Comment: Occasionally   • Drug use: No   • Sexual activity: Yes     Birth control/protection: None     Comment: Hysterectomy           Objective   Physical Exam  Vitals and nursing note reviewed.   Constitutional:       General: She is not in acute distress.     Appearance: She is well-developed and normal weight. She is not ill-appearing, toxic-appearing or diaphoretic.   HENT:      Head: Normocephalic.   Eyes:      Pupils: Pupils are equal, round, and reactive to light.   Cardiovascular:      Rate and Rhythm: Normal rate and regular rhythm.  No extrasystoles are present.     Pulses:           Radial pulses are 2+ on the right side and 2+ on the left side.        Dorsalis pedis pulses are 2+ on the right side and 2+ on the left side.      Heart sounds: Normal heart sounds. No murmur heard.      Pulmonary:      Effort: Pulmonary effort is normal. No tachypnea, accessory muscle usage or respiratory  distress.      Breath sounds: No decreased breath sounds, wheezing, rhonchi or rales.   Chest:      Chest wall: No tenderness.   Abdominal:      General: Bowel sounds are normal.      Palpations: Abdomen is soft. There is no hepatomegaly or splenomegaly.      Tenderness: There is no abdominal tenderness.   Musculoskeletal:         General: Normal range of motion.      Cervical back: Neck supple.      Right lower leg: No tenderness. No edema.      Left lower leg: No tenderness. No edema.   Skin:     General: Skin is warm and dry.      Capillary Refill: Capillary refill takes less than 2 seconds.   Neurological:      General: No focal deficit present.      Mental Status: She is alert and oriented to person, place, and time.   Psychiatric:         Mood and Affect: Mood normal.         Behavior: Behavior normal.         Procedures  none         ED Course      Labs Reviewed   COMPREHENSIVE METABOLIC PANEL - Abnormal; Notable for the following components:       Result Value    Creatinine 1.07 (*)     Sodium 133 (*)     Chloride 95 (*)     eGFR Non  Amer 49 (*)     All other components within normal limits    Narrative:     GFR Normal >60  Chronic Kidney Disease <60  Kidney Failure <15     CBC WITH AUTO DIFFERENTIAL - Abnormal; Notable for the following components:    WBC 11.26 (*)     RBC 3.75 (*)     Hemoglobin 11.5 (*)     Lymphocyte % 14.7 (*)     Neutrophils, Absolute 8.29 (*)     Monocytes, Absolute 1.19 (*)     Immature Grans, Absolute 0.06 (*)     All other components within normal limits   BNP (IN-HOUSE) - Normal    Narrative:     Among patients with dyspnea, NT-proBNP is highly sensitive for the detection of acute congestive heart failure. In addition NT-proBNP of <300 pg/ml effectively rules out acute congestive heart failure with 99% negative predictive value.    Results may be falsely decreased if patient taking Biotin.     TROPONIN (IN-HOUSE) - Normal    Narrative:     Troponin T Reference Range:  <=  0.03 ng/mL-   Negative for AMI  >0.03 ng/mL-     Abnormal for myocardial necrosis.  Clinicians would have to utilize clinical acumen, EKG, Troponin and serial changes to determine if it is an Acute Myocardial Infarction or myocardial injury due to an underlying chronic condition.       Results may be falsely decreased if patient taking Biotin.     MAGNESIUM - Normal   TSH - Normal   CBC AND DIFFERENTIAL    Narrative:     The following orders were created for panel order CBC & Differential.  Procedure                               Abnormality         Status                     ---------                               -----------         ------                     CBC Auto Differential[[id number]]        Abnormal            Final result                 Please view results for these tests on the individual orders.   EXTRA TUBES    Narrative:     The following orders were created for panel order Extra Tubes.  Procedure                               Abnormality         Status                     ---------                               -----------         ------                     Gold Top - SST[[id number]]                                   Final result                 Please view results for these tests on the individual orders.   GOLD TOP - SST     No results found.     EKG October 14, 2021 at 1330 reveals normal sinus rhythm with sinus arrhythmia rate of 89 bpm.  T wave flattening in 3 and aVF without inversion.  Also in V3 through V6.  No ST elevation or depression.  No evidence of acute ischemia.  .        HEART Score (for prediction of 6-week risk of major adverse cardiac event) reviewed and/or performed as part of the patient evaluation and treatment planning process.  The result associated with this review/performance is: 3     HEART Score for Major Cardiac Events from [employer]  on 10/14/2021  ** All calculations should be rechecked by clinician prior to use **    RESULT SUMMARY:  3 points  Low Score (0-3  points)    Risk of MACE of 0.9-1.7%.      INPUTS:  History --> 0 = Slightly suspicious  EKG --> 0 = Normal  Age --> 2 = ?65  Risk factors --> 1 = 1-2 risk factors  Initial troponin --> 0 = ?normal limit      MDM  Number of Diagnoses or Management Options     Amount and/or Complexity of Data Reviewed  Clinical lab tests: reviewed  Tests in the medicine section of CPT®: reviewed    Patient Progress  Patient progress: stable    Patient with stable vital signs throughout stay.  Laboratory studies here again unremarkable.  Was unable to obtain records from [facility] however patient reports a CT angiogram which reportedly was negative.  She has tenderness is reproducible to palpation.  Advised on symptomatic treatment.  Cautioned on continued use of Toradol given she has been told not to take ibuprofen in the past and her age.  She acknowledges understanding of this.  Will follow closely with primary care.    Final diagnoses:   Musculoskeletal chest pain       ED Disposition  ED Disposition     ED Disposition Condition Comment    Discharge Stable           Hansa Christian MD  [address] [place] [address]  [phone]               Medication List      No changes were made to your prescriptions during this visit.          Александр Freitas,   10/14/21 1746

## 2021-10-14 NOTE — ED NOTES
"Pt reports \"having spells of back pain and chest pain X2 weeks that comes and goes\"      Harpreet Berman  10/14/21 0383    "

## 2021-10-15 LAB
QT INTERVAL: 320 MS
QTC INTERVAL: 389 MS

## 2023-08-18 ENCOUNTER — TRANSCRIBE ORDERS (OUTPATIENT)
Dept: PODIATRY | Facility: CLINIC | Age: 83
End: 2023-08-18
Payer: MEDICARE

## 2023-08-18 DIAGNOSIS — M79.672 PAIN IN LEFT FOOT: Primary | ICD-10-CM

## 2023-08-29 DIAGNOSIS — I65.23 CAROTID ATHEROSCLEROSIS, BILATERAL: Primary | ICD-10-CM

## 2023-08-29 DIAGNOSIS — I73.9 CLAUDICATION: ICD-10-CM

## 2023-08-31 ENCOUNTER — OFFICE VISIT (OUTPATIENT)
Dept: PODIATRY | Facility: CLINIC | Age: 83
End: 2023-08-31
Payer: MEDICARE

## 2023-08-31 VITALS
HEART RATE: 80 BPM | DIASTOLIC BLOOD PRESSURE: 60 MMHG | HEIGHT: 66 IN | SYSTOLIC BLOOD PRESSURE: 110 MMHG | BODY MASS INDEX: 24.11 KG/M2 | OXYGEN SATURATION: 98 % | WEIGHT: 150 LBS

## 2023-08-31 DIAGNOSIS — M79.672 LEFT FOOT PAIN: Primary | ICD-10-CM

## 2023-08-31 DIAGNOSIS — R09.89 WEAK PULSE: ICD-10-CM

## 2023-08-31 PROCEDURE — 3074F SYST BP LT 130 MM HG: CPT | Performed by: NURSE PRACTITIONER

## 2023-08-31 PROCEDURE — 1160F RVW MEDS BY RX/DR IN RCRD: CPT | Performed by: NURSE PRACTITIONER

## 2023-08-31 PROCEDURE — 1159F MED LIST DOCD IN RCRD: CPT | Performed by: NURSE PRACTITIONER

## 2023-08-31 PROCEDURE — 99203 OFFICE O/P NEW LOW 30 MIN: CPT | Performed by: NURSE PRACTITIONER

## 2023-08-31 PROCEDURE — 3078F DIAST BP <80 MM HG: CPT | Performed by: NURSE PRACTITIONER

## 2023-08-31 NOTE — PROGRESS NOTES
Karen Steven  1940  83 y.o. female      08/31/2023    Chief Complaint   Patient presents with    Left Foot - Pain    Right Foot - Pain       History of Present Illness    Karen Steven is a 83 y.o.female who reports to clinic today for bilateral foot and leg pain. Patient states it hurts worse when she is up on them. Pain started about a year ago. X-rays obtained today.       Past Medical History:   Diagnosis Date    Allergies     Candidal skin infection 9/20/2017    Diabetes     Hyperlipidemia     Hypertension     Menopausal and female climacteric states 9/20/2017    Overactive bladder 9/20/2017         Past Surgical History:   Procedure Laterality Date    BACK SURGERY  03/24/2009    L-2, L-3/2 Rods 4 Screws    BACK SURGERY  11/17/2009    L-3, L-4, 2 Rods 6 Screws    BACK SURGERY  04/07/2010    L-5, S-1 2 Rods 8 Screws    BACK SURGERY  04/21/2011    BACK SURGERY  06/26/2014    BLADDER SURGERY  03/24/1997    BREAST BIOPSY  1986    FOOT SURGERY Left 2001    Bunion    FOOT SURGERY Right 2004    Bunion    FOOT SURGERY Right 2005    Removed 3 Metatarsus heads    HIP SURGERY Left 08/27/2015    Torn Tendon    HYSTERECTOMY      Partial in 1970    HYSTERECTOMY      Complete    KNEE SURGERY Right 06/26/2012    KNEE SURGERY Left 08/21/2014    Replacement         History reviewed. No pertinent family history.    Allergies   Allergen Reactions    Altace [Ramipril] Swelling       Social History     Socioeconomic History    Marital status:    Tobacco Use    Smoking status: Never   Vaping Use    Vaping Use: Never used   Substance and Sexual Activity    Alcohol use: Not Currently     Comment: Occasionally    Drug use: No    Sexual activity: Yes     Birth control/protection: None     Comment: Hysterectomy         Current Outpatient Medications   Medication Sig Dispense Refill    aspirin 81 MG EC tablet Take 81 mg by mouth Daily.      Biotin (BIOTIN MAXIMUM STRENGTH) 10 MG tablet Take 1 tablet by mouth Daily.       "cetirizine (zyrTEC) 10 MG tablet Take 10 mg by mouth Daily.      Cholecalciferol (VITAMIN D3) 5000 units tablet tablet Take 5,000 Units by mouth Daily.      clotrimazole-betamethasone (LOTRISONE) 1-0.05 % cream Apply  topically 2 (Two) Times a Day. 45 g tube 1 each 12    ferrous sulfate 325 (65 FE) MG tablet Take 325 mg by mouth Daily With Breakfast.      fluticasone (FLONASE) 50 MCG/ACT nasal spray 2 sprays into each nostril Daily.      folic acid (FOLVITE) 1 MG tablet Take 1 mg by mouth Daily.      gabapentin (NEURONTIN) 300 MG capsule Take 300 mg by mouth 3 (Three) Times a Day.      Multiple Vitamins-Minerals (CENTRUM ULTRA WOMENS PO) Take 1 tablet by mouth Daily.      simvastatin (ZOCOR) 40 MG tablet Take 40 mg by mouth Every Night.      venlafaxine XR (EFFEXOR-XR) 37.5 MG 24 hr capsule Take 37.5 mg by mouth Daily.       No current facility-administered medications for this visit.       Review of Systems   Constitutional: Negative.    Respiratory: Negative.     Cardiovascular: Negative.    Musculoskeletal:         Foot and leg pain   Skin: Negative.    Neurological: Negative.    Hematological: Negative.    Psychiatric/Behavioral: Negative.         OBJECTIVE    /60   Pulse 80   Ht 167.6 cm (66\")   Wt 68 kg (150 lb)   SpO2 98%   BMI 24.21 kg/m²     Physical Exam  Vitals reviewed.   Constitutional:       General: She is not in acute distress.     Appearance: Normal appearance.   HENT:      Head: Normocephalic.   Eyes:      Pupils: Pupils are equal, round, and reactive to light.   Cardiovascular:      Pulses:           Dorsalis pedis pulses are detected w/ Doppler on the right side and detected w/ Doppler on the left side.        Posterior tibial pulses are detected w/ Doppler on the right side and detected w/ Doppler on the left side.   Pulmonary:      Effort: No respiratory distress.   Musculoskeletal:         General: No signs of injury.      Cervical back: Neck supple.      Left foot: Deformity and " bunion present.   Skin:     General: Skin is warm and dry.      Findings: No erythema.   Neurological:      General: No focal deficit present.      Mental Status: She is alert.   Psychiatric:         Mood and Affect: Mood normal.         Behavior: Behavior normal.            Procedures        ASSESSMENT AND PLAN    Diagnoses and all orders for this visit:    1. Left foot pain (Primary)  -     XR Foot 3+ View Left    2. Weak pulse  -     Cancel: US Arterial Doppler Lower Extremity Complete; Future        Radiographs taken and reviewed.  Hallux valgus deformity noted.  Reviewed patient's past medical history and occluding carotid stenosis with claudication for which she is evaluated and followed by [facility].  Patient has appointment on 9- with [facility] APRN with studies.  Discussed foot and leg pain with possible correlation to arterial circulation.  Recommending supportive foot care and inserts for conservative therapy until after vascular studies are completed.  F/u after pt is evaluated by [facility].            This document has been electronically signed by NIKHIL Galarza on September 6, 2023 16:41 CDT